# Patient Record
Sex: FEMALE
[De-identification: names, ages, dates, MRNs, and addresses within clinical notes are randomized per-mention and may not be internally consistent; named-entity substitution may affect disease eponyms.]

---

## 2021-12-04 ENCOUNTER — HOSPITAL ENCOUNTER (EMERGENCY)
Dept: HOSPITAL 5 - ED | Age: 31
Discharge: HOME | End: 2021-12-04
Payer: MEDICAID

## 2021-12-04 VITALS — DIASTOLIC BLOOD PRESSURE: 75 MMHG | SYSTOLIC BLOOD PRESSURE: 123 MMHG

## 2021-12-04 DIAGNOSIS — Z76.0: ICD-10-CM

## 2021-12-04 DIAGNOSIS — Z79.899: ICD-10-CM

## 2021-12-04 DIAGNOSIS — Z88.0: ICD-10-CM

## 2021-12-04 DIAGNOSIS — R51.9: Primary | ICD-10-CM

## 2021-12-04 PROCEDURE — 99282 EMERGENCY DEPT VISIT SF MDM: CPT

## 2021-12-04 NOTE — EMERGENCY DEPARTMENT REPORT
ED General Adult HPI





- General


Chief complaint: Headache


Stated complaint: HEADACHE


Time Seen by Provider: 12/04/21 17:46


Source: patient


Mode of arrival: Ambulatory


Limitations: No Limitations





- History of Present Illness


Initial comments: 





Patient is a 31-year-old female presents emergency room with complaints of a 

posterior headache for approximately 3 weeks patient states that she has been 

out of her medications for 3 weeks. She states that she takes olanzapine 10 mg 

and sertraline 50 mg daily she states that whenever she does not take her medica

tions she began to have headaches. She states that she called her doctor for 

refill of her medications but states that her doctor was not able to refill her 

medications until her appointment which is not until 12/16/2021. She denies any 

vision changes, vomiting, neck stiffness, fever, numbness, weakness, speech 

disturbance, gait disturbance. allergy to penicillin. Patient states that she 

had a tubal ligation. She reports she has a history of schizophrenia, bipolar, 

depression, ADHD. She denies any SI or HI.


Severity scale (0 -10): 8





- Related Data


                                  Previous Rx's











 Medication  Instructions  Recorded  Last Taken  Type


 


Butalb/Acetaminophen/Caffeine 1 cap PO Q8HR PRN #12 cap 12/04/21 Unknown Rx





[Fioricet -40 mg CAP]    


 


OLANZapine [Olanzapine] 10 mg PO DAILY #30 tablet 12/04/21 Unknown Rx


 


Sertraline [Zoloft] 50 mg PO QDAY #30 tablet 12/04/21 Unknown Rx











                                    Allergies











Allergy/AdvReac Type Severity Reaction Status Date / Time


 


Penicillins Allergy  Unknown Verified 12/04/21 17:41














ED Review of Systems


ROS: 


Stated complaint: HEADACHE


Other details as noted in HPI





Comment: All other systems reviewed and negative





ED Past Medical Hx





- Medications


Home Medications: 


                                Home Medications











 Medication  Instructions  Recorded  Confirmed  Last Taken  Type


 


Butalb/Acetaminophen/Caffeine 1 cap PO Q8HR PRN #12 cap 12/04/21  Unknown Rx





[Fioricet -40 mg CAP]     


 


OLANZapine [Olanzapine] 10 mg PO DAILY #30 tablet 12/04/21  Unknown Rx


 


Sertraline [Zoloft] 50 mg PO QDAY #30 tablet 12/04/21  Unknown Rx














ED Physical Exam





- General


Limitations: No Limitations


General appearance: alert, in no apparent distress





- Head


Head exam: Present: atraumatic, normocephalic





- Eye


Eye exam: Present: normal appearance, PERRL, EOMI





- ENT


ENT exam: Present: mucous membranes moist





- Respiratory


Respiratory exam: Present: normal lung sounds bilaterally.  Absent: respiratory 

distress, wheezes, rales, rhonchi, stridor, chest wall tenderness, accessory 

muscle use, decreased breath sounds, prolonged expiratory





- Cardiovascular


Cardiovascular Exam: Present: regular rate, normal rhythm, normal heart sounds. 

 Absent: systolic murmur, diastolic murmur, rubs, gallop





- Neurological Exam


Neurological exam: Present: alert, oriented X3, CN II-XII intact, normal gait.  

Absent: motor sensory deficit





- Psychiatric


Psychiatric exam: Present: normal affect, normal mood





- Skin


Skin exam: Present: warm, dry, intact





ED Course


                                   Vital Signs











  12/04/21





  17:44


 


Temperature 98.1 F


 


Pulse Rate 69


 


Respiratory 20





Rate 


 


Blood Pressure 123/75





[Right] 


 


O2 Sat by Pulse 100





Oximetry 














ED Medical Decision Making





- Medical Decision Making





Patient is a 31-year-old female presents emergency room with complaints of a 

posterior headache for approximately 3 weeks patient states that she has been 

out of her medications for 3 weeks. She states that she takes olanzapine 10 mg 

and sertraline 50 mg daily she states that whenever she does not take her 

medications she began to have headaches. She states that she called her doctor 

for refill of her medications but states that her doctor was not able to refill 

her medications until her appointment which is not until 12/16/2021. She denies 

any vision changes, vomiting, neck stiffness, fever, numbness, weakness, speech 

disturbance, gait disturbance. allergy to penicillin. Patient states that she 

had a tubal ligation. She reports she has a history of schizophrenia, bipolar, 

depression, ADHD. She denies any SI or HI.  Vitals are normal.  Patient has no 

focal neuro deficits on exam.  She has no meningeal signs.  She has no signs of 

acute psychosis at this time.  She is alert and oriented x4.  Patient given 

prescription for medications.  Advised patient Please take medication as 

prescribed. Increase your water intake. Follow-up with your primary care doctor.

 Follow-up with your mental health doctor. Return to emergency room immediately 

for any new or worsening symptoms


Critical care attestation.: 


If time is entered above; I have spent that time in minutes in the direct care 

of this critically ill patient, excluding procedure time.








ED Disposition


Clinical Impression: 


 Medication refill





Headache


Qualifiers:


 Headache type: unspecified Headache chronicity pattern: acute headache 

Intractability: not intractable Qualified Code(s): R51.9 - Headache, unspecified





Disposition: 01 HOME / SELF CARE / HOMELESS


Is pt being admited?: No


Does the pt Need Aspirin: No


Condition: Stable


Instructions:  General Headache Without Cause


Additional Instructions: 


Please take medication as prescribed. Increase your water intake. Follow-up with

 your primary care doctor. Follow-up with your mental health doctor. Return to 

emergency room immediately for any new or worsening symptoms


Prescriptions: 


Butalb/Acetaminophen/Caffeine [Fioricet -40 mg CAP] 1 cap PO Q8HR PRN #12 

cap


 PRN Reason: headache


OLANZapine [Olanzapine] 10 mg PO DAILY #30 tablet


Sertraline [Zoloft] 50 mg PO QDAY #30 tablet


Referrals: 


your, primary care doctor [Other] - 3-5 Days


your, mental health doctor [Other] - 3-5 Days


Time of Disposition: 17:54


Print Language: ENGLISH